# Patient Record
Sex: MALE | Race: WHITE | NOT HISPANIC OR LATINO | Employment: UNEMPLOYED | URBAN - METROPOLITAN AREA
[De-identification: names, ages, dates, MRNs, and addresses within clinical notes are randomized per-mention and may not be internally consistent; named-entity substitution may affect disease eponyms.]

---

## 2024-02-27 ENCOUNTER — APPOINTMENT (OUTPATIENT)
Dept: RADIOLOGY | Facility: HOSPITAL | Age: 49
End: 2024-02-27
Payer: OTHER GOVERNMENT

## 2024-02-27 ENCOUNTER — HOSPITAL ENCOUNTER (EMERGENCY)
Facility: HOSPITAL | Age: 49
Discharge: HOME | End: 2024-02-27
Payer: OTHER GOVERNMENT

## 2024-02-27 VITALS
RESPIRATION RATE: 18 BRPM | BODY MASS INDEX: 32.27 KG/M2 | OXYGEN SATURATION: 96 % | WEIGHT: 189 LBS | HEIGHT: 64 IN | HEART RATE: 86 BPM | SYSTOLIC BLOOD PRESSURE: 126 MMHG | DIASTOLIC BLOOD PRESSURE: 78 MMHG | TEMPERATURE: 98.6 F

## 2024-02-27 DIAGNOSIS — S62.667B OPEN NONDISPLACED FRACTURE OF DISTAL PHALANX OF LEFT LITTLE FINGER, INITIAL ENCOUNTER: ICD-10-CM

## 2024-02-27 DIAGNOSIS — S61.317A LACERATION OF LEFT LITTLE FINGER WITH DAMAGE TO NAIL, FOREIGN BODY PRESENCE UNSPECIFIED, INITIAL ENCOUNTER: Primary | ICD-10-CM

## 2024-02-27 PROCEDURE — 12001 RPR S/N/AX/GEN/TRNK 2.5CM/<: CPT

## 2024-02-27 PROCEDURE — 96365 THER/PROPH/DIAG IV INF INIT: CPT | Mod: 59

## 2024-02-27 PROCEDURE — 2500000004 HC RX 250 GENERAL PHARMACY W/ HCPCS (ALT 636 FOR OP/ED)

## 2024-02-27 PROCEDURE — 99284 EMERGENCY DEPT VISIT MOD MDM: CPT | Mod: 25

## 2024-02-27 PROCEDURE — 2500000005 HC RX 250 GENERAL PHARMACY W/O HCPCS

## 2024-02-27 PROCEDURE — 2500000001 HC RX 250 WO HCPCS SELF ADMINISTERED DRUGS (ALT 637 FOR MEDICARE OP)

## 2024-02-27 PROCEDURE — 73130 X-RAY EXAM OF HAND: CPT | Mod: RIGHT SIDE | Performed by: RADIOLOGY

## 2024-02-27 PROCEDURE — 73130 X-RAY EXAM OF HAND: CPT | Mod: RT

## 2024-02-27 RX ORDER — BACITRACIN ZINC 500 UNIT/G
1 OINTMENT (GRAM) TOPICAL 2 TIMES DAILY
Qty: 1 G | Refills: 0 | Status: SHIPPED | OUTPATIENT
Start: 2024-02-27 | End: 2024-03-03

## 2024-02-27 RX ORDER — CEFAZOLIN SODIUM 2 G/100ML
2 INJECTION, SOLUTION INTRAVENOUS ONCE
Status: COMPLETED | OUTPATIENT
Start: 2024-02-27 | End: 2024-02-27

## 2024-02-27 RX ORDER — LIDOCAINE HYDROCHLORIDE 10 MG/ML
10 INJECTION INFILTRATION; PERINEURAL ONCE
Status: COMPLETED | OUTPATIENT
Start: 2024-02-27 | End: 2024-02-27

## 2024-02-27 RX ORDER — HYDROCODONE BITARTRATE AND ACETAMINOPHEN 5; 325 MG/1; MG/1
1 TABLET ORAL ONCE
Status: COMPLETED | OUTPATIENT
Start: 2024-02-27 | End: 2024-02-27

## 2024-02-27 RX ORDER — NAPROXEN SODIUM 550 MG/1
550 TABLET ORAL
Qty: 14 TABLET | Refills: 0 | Status: SHIPPED | OUTPATIENT
Start: 2024-02-27 | End: 2024-03-05

## 2024-02-27 RX ADMIN — LIDOCAINE HYDROCHLORIDE 100 MG: 10 INJECTION, SOLUTION INFILTRATION; PERINEURAL at 20:18

## 2024-02-27 RX ADMIN — HYDROCODONE BITARTRATE AND ACETAMINOPHEN 1 TABLET: 5; 325 TABLET ORAL at 20:18

## 2024-02-27 RX ADMIN — CEFAZOLIN SODIUM 2 G: 2 INJECTION, SOLUTION INTRAVENOUS at 20:18

## 2024-02-27 ASSESSMENT — COLUMBIA-SUICIDE SEVERITY RATING SCALE - C-SSRS
1. IN THE PAST MONTH, HAVE YOU WISHED YOU WERE DEAD OR WISHED YOU COULD GO TO SLEEP AND NOT WAKE UP?: NO
6. HAVE YOU EVER DONE ANYTHING, STARTED TO DO ANYTHING, OR PREPARED TO DO ANYTHING TO END YOUR LIFE?: NO
2. HAVE YOU ACTUALLY HAD ANY THOUGHTS OF KILLING YOURSELF?: NO

## 2024-02-28 NOTE — ED PROVIDER NOTES
HPI   Chief Complaint   Patient presents with    Finger Laceration       History provided by: Patient    Limitations to history: None    CC: Finger injury    HPI: 49-year-old male [facility] emergency [facility] for right fifth finger injury that occurred earlier today patient was pushing a cart when his finger got crushed between a car and a doorknob.  Reports a laceration over the tip of his finger.  Denies numbness or tingling, does report an aching pain.  Says the bleeding was mild originally but now controlled on external pressure.  His tetanus is up-to-date.  He denies use of anticoagulants.  Denies pain or injury elsewhere.  Denies any difficulty moving the finger.  Denies pain or injury elsewhere.  Denies all other systemic symptoms.    ROS: Negative unless mentioned in HPI    Social Hx: Denies tobacco, alcohol, drug use    Medical Hx: Denies history of chronic medical conditions medication use.  Denies allergies Per immunizations up-to-date.    Surgical HX: Denies    Physical exam:    Constitutional: Patient is well-nourished and well-developed.  Sitting comfortably in the room and in no distress.  Oriented to person, place, time, and situation.    HEENT: Head is normocephalic, atraumatic. Patient's airway is patent.  Tympanic membranes are clear bilaterally.  Nasal mucosa clear.  Mouth with normal mucosa.  Throat is not erythematous and there are no oropharyngeal exudates, uvula is midline.  No obvious facial deformities.    Eyes: Clear bilaterally.  Pupils are equal round and reactive to light and accommodation.  Extraocular movements intact.      Cardiac: Regular rate, regular rhythm.  Heart sounds S1, S2.  No murmurs, rubs, or gallops.  PMI nondisplaced.  No JVD.    Respiratory: Regular respiratory rate and effort.  Breath sounds are clear and equal bilaterally, no adventitious lung sounds.  Patient is speaking in full sentences and is in no apparent respiratory  distress. No use of accessory muscles.      Gastrointestinal: Abdomen is soft, nondistended, and nontender.  There are no obvious deformities.  No rebound tenderness or guarding.  Bowel sounds are normal active.    Genitourinary: No CVA or flank tenderness.    Musculoskeletal: Patient has reproducible tenderness over the distal phalanx of the right fifth finger.  Patient has an approximate 2 to 2.5 cm laceration over the dorsal aspect of the fifth distal phalanx.  There is some involvement over the volar aspect.  It does not go completely through and the distal tip is still securely attached.  The distal tip still has good perfusion.  Patient does have ability to flex and extend the distal tip and phalanx.  No obvious foreign body.  No obvious exposed bone.  Patient has equal range of motion in all extremities and no strength deficiencies.   No back or neck tenderness.  Capillary refill less than 3 seconds.  Strong peripheral pulses.  No sensory deficits.    Neurological: Patient is alert and oriented.  No focal deficits.  5/5 strength in all extremities.  Cranial nerves II through XII intact. GCS15.     Skin: See MSK exam for more details    Psych: Appropriate mood and affect.  No apparent risk to self or others.    Heme/lymph: No adenopathy, lymphadenopathy, or splenomegaly    Physical exam is otherwise negative unless stated above or in history of present illness.                          Dionne Coma Scale Score: 15                     Patient History   No past medical history on file.  No past surgical history on file.  No family history on file.  Social History     Tobacco Use    Smoking status: Not on file    Smokeless tobacco: Not on file   Substance Use Topics    Alcohol use: Not on file    Drug use: Not on file       Physical Exam   ED Triage Vitals [02/27/24 1828]   Temperature Heart Rate Respirations BP   37 °C (98.6 °F) 86 18 126/78      Pulse Ox Temp Source Heart Rate Source Patient Position   96 %  Temporal Monitor Sitting      BP Location FiO2 (%)     Left arm --       Physical Exam    ED Course & MDM   Diagnoses as of 02/27/24 2109   Laceration of left little finger with damage to nail, foreign body presence unspecified, initial encounter   Open nondisplaced fracture of distal phalanx of left little finger, initial encounter     Patient updated on plan for lab testing, IV insertion, radiology imaging, and medications to be administered while in the ER (if indicated). Patient updated on expected wait times for testing and results. Patient provided my name and told to ask any staff member for questions or concerns if they should arise. Electronic medical record reviewed.     MDM    Upon initial assessment, patient was healthy non-toxic appearing and in no apparent distress.     Patient presented to the emergency department with the chief complaint right fifth finger injury.  Patient has reproducible tenderness over the distal phalanx of the right fifth finger.  Patient has an approximate 2 to 2.5 cm laceration over the dorsal aspect of the fifth distal phalanx.  There is some involvement over the volar aspect.  It does not go completely through and the distal tip is still securely attached.  The distal tip still has good perfusion.  Patient does have ability to flex and extend the distal tip and phalanx.  No obvious foreign body.  No obvious exposed bone.  Patient has equal range of motion in all extremities and no strength deficiencies.   No back or neck tenderness.  Capillary refill less than 3 seconds.  Strong peripheral pulses.  No sensory deficits.On arrival to the emergency department, vital signs were within normal limits    No findings of just an arterial, tendon, nerve injury.  Will have the nursing staff cleaned the patient's wound and start him on IM Ancef given my concern for an open fracture.  Give the patient Norco for his discomfort.  Will then provide local anesthesia and fix the laceration  using sutures.    X-ray confirms open left 5th digit tuft fracture..  Patient tolerated this well.  Was given wound care supplies for your to go home with and his finger be splinted within the aluminum splint.  Will be discharged with bacitracin and Anaprox per discussed wound care including signs of infection when to get the sutures removed.  Patient will follow-up with orthopedics.  All questions and concerns addressed.  Reasons to return to ER discussed.  Patient verbalized understanding and agreement with the treatment plan and they remained hemodynamically stable in the ER.    This note was dictated using a speech recognition program.  While an attempt was made at proof-reading to minimize errors, minor errors in transcription may be present    Medical Decision Making      Procedure  Laceration Repair    Performed by: Jose Hi PA-C  Authorized by: Jose Hi PA-C    Consent:     Consent obtained:  Verbal    Consent given by:  Patient    Risks discussed:  Pain    Alternatives discussed:  No treatment  Universal protocol:     Procedure explained and questions answered to patient or proxy's satisfaction: yes      Patient identity confirmed:  Verbally with patient  Anesthesia:     Anesthesia method:  Local infiltration    Local anesthetic:  Lidocaine 1% w/o epi  Laceration details:     Location:  Finger    Finger location:  L small finger    Length (cm):  2.3  Pre-procedure details:     Preparation:  Patient was prepped and draped in usual sterile fashion  Exploration:     Wound extent: no fascia violation noted, no foreign bodies/material noted, no muscle damage noted, no nerve damage noted, no tendon damage noted, no underlying fracture noted and no vascular damage noted    Treatment:     Area cleansed with:  Chlorhexidine    Amount of cleaning:  Standard    Irrigation solution:  Sterile saline    Debridement:  None    Undermining:  None    Layers repaired: skin.  Skin repair:     Repair method:   Sutures    Suture size:  4-0    Wound skin closure material used: ethilon.    Suture technique:  Simple interrupted    Number of sutures:  7  Approximation:     Approximation:  Close  Repair type:     Repair type:  Simple  Post-procedure details:     Dressing:  Non-adherent dressing    Procedure completion:  Tolerated       Jose Hi PA-C  02/27/24 0152